# Patient Record
(demographics unavailable — no encounter records)

---

## 2018-04-29 NOTE — EMERGENCY DEPARTMENT REPORT
ED ENT HPI





- General


Chief complaint: Sore Throat


Stated complaint: SWOLLEN NECK/TONSILS


Time Seen by Provider: 04/29/18 10:42


Source: patient


Mode of arrival: Ambulatory


Limitations: No Limitations





- History of Present Illness


Initial comments: 





This is a 18-year-old female nontoxic, well nourished in appearance, no acute 

signs of distress presents to the ED with c/o of sore throat x1 day. Patient 

describes sore throat as swallowing razer blades.  Patient denies any fever, 

chills, headache, stiff neck, nausea, vomiting, chest pain, shortness of breath

, numbness or tingling. Patient denies any drooling or hoarseness.  Patient 

denies any allergies or significant past medical history.


MD complaint: sore throat


-: days(s) (1)


Location: throat


Severity: mild


Severity scale (0 -10): 8


Quality: aching


Consistency: constant


Improves with: none


Worsens with: swallowing


Associated Symptoms: pain with swallowing, sore throat.  denies: fever, cough, 

gum swelling, toothache, tinnitus, hearing loss, discharge from ear, rhinorrhea





- Related Data


 Previous Rx's











 Medication  Instructions  Recorded  Last Taken  Type


 


Clindamycin [Clindamycin CAP] 300 mg PO Q8H 7 Days  cap 04/29/18 Unknown Rx


 


Ibuprofen [Motrin] 600 mg PO Q8H PRN #30 tablet 04/29/18 Unknown Rx


 


Nystas/Diphen/Xyl Visc/Mylanta 15 ml MM Q4H PRN 7 Days  ml 04/29/18 Unknown Rx





[Magic Mouthwash]    


 


Prednisone [predniSONE 10 mg 10 mg PO .TAPER #1 tab.ds.pk 04/29/18 Unknown Rx





(6-Day Pack, 21 Tabs)]    











 Allergies











Allergy/AdvReac Type Severity Reaction Status Date / Time


 


No Known Allergies Allergy   Unverified 04/29/18 10:38














ED Dental HPI





- General


Chief complaint: Sore Throat


Stated complaint: SWOLLEN NECK/TONSILS


Time Seen by Provider: 04/29/18 10:42


Source: patient


Mode of arrival: Ambulatory


Limitations: No Limitations





- Related Data


 Previous Rx's











 Medication  Instructions  Recorded  Last Taken  Type


 


Clindamycin [Clindamycin CAP] 300 mg PO Q8H 7 Days  cap 04/29/18 Unknown Rx


 


Ibuprofen [Motrin] 600 mg PO Q8H PRN #30 tablet 04/29/18 Unknown Rx


 


Nystas/Diphen/Xyl Visc/Mylanta 15 ml MM Q4H PRN 7 Days  ml 04/29/18 Unknown Rx





[Magic Mouthwash]    


 


Prednisone [predniSONE 10 mg 10 mg PO .TAPER #1 tab.ds.pk 04/29/18 Unknown Rx





(6-Day Pack, 21 Tabs)]    











 Allergies











Allergy/AdvReac Type Severity Reaction Status Date / Time


 


No Known Allergies Allergy   Unverified 04/29/18 10:38














ED Review of Systems


ROS: 


Stated complaint: SWOLLEN NECK/TONSILS


Other details as noted in HPI





Constitutional: denies: chills, fever


Eyes: denies: eye pain, eye discharge, vision change


ENT: throat pain.  denies: ear pain


Respiratory: denies: cough, shortness of breath, wheezing


Cardiovascular: denies: chest pain, palpitations


Endocrine: no symptoms reported


Gastrointestinal: denies: abdominal pain, nausea, diarrhea


Genitourinary: denies: urgency, dysuria, discharge


Musculoskeletal: denies: back pain, joint swelling, arthralgia


Skin: denies: rash, lesions


Neurological: denies: headache, weakness, paresthesias


Psychiatric: denies: anxiety, depression


Hematological/Lymphatic: denies: easy bleeding, easy bruising





ED Past Medical Hx





- Past Medical History


Previous Medical History?: No





- Surgical History


Past Surgical History?: No





- Social History


Smoking Status: Never Smoker


Substance Use Type: None





- Medications


Home Medications: 


 Home Medications











 Medication  Instructions  Recorded  Confirmed  Last Taken  Type


 


Clindamycin [Clindamycin CAP] 300 mg PO Q8H 7 Days  cap 04/29/18  Unknown Rx


 


Ibuprofen [Motrin] 600 mg PO Q8H PRN #30 tablet 04/29/18  Unknown Rx


 


Nystas/Diphen/Xyl Visc/Mylanta 15 ml MM Q4H PRN 7 Days  ml 04/29/18  Unknown Rx





[Magic Mouthwash]     


 


Prednisone [predniSONE 10 mg 10 mg PO .TAPER #1 tab.ds.pk 04/29/18  Unknown Rx





(6-Day Pack, 21 Tabs)]     














ED Physical Exam





- General


Limitations: No Limitations


General appearance: alert, in no apparent distress





- Head


Head exam: Present: atraumatic, normocephalic





- Eye


Eye exam: Present: normal appearance


Pupils: Present: normal accommodation





- ENT


ENT exam: Present: mucous membranes moist, TM's normal bilaterally, normal 

external ear exam





- Expanded ENT Exam


  ** Expanded


Ear exam: Present: normal external inspection


Mouth exam: Present: normal external inspection, tongue normal.  Absent: 

drooling, trismus, muffled voice, tongue elevation, laceration


Teeth exam: Present: normal inspection


Throat exam: Positive: tonsillar erythema, tonsillomegaly (2+), tonsillar 

exudate, other (Uvula midline. No abscess or swelling noted. ).  Negative: R 

peritonsillar mass, L peritonsillar mass





- Neck


Neck exam: Present: normal inspection, full ROM, lymphadenopathy (bilateral 

tonsillar).  Absent: tenderness, meningismus





- Respiratory


Respiratory exam: Present: normal lung sounds bilaterally.  Absent: respiratory 

distress, wheezes, rales, rhonchi, stridor, chest wall tenderness, accessory 

muscle use, decreased breath sounds, prolonged expiratory





- Cardiovascular


Cardiovascular Exam: Present: regular rate, normal rhythm, normal heart sounds.

  Absent: irregular rhythm, systolic murmur, diastolic murmur, rubs, gallop





- GI/Abdominal


GI/Abdominal exam: Present: soft, normal bowel sounds.  Absent: distended, 

tenderness, guarding, rebound, rigid





- Rectal


Rectal exam: Present: deferred





- Extremities Exam


Extremities exam: Present: normal inspection, full ROM, normal capillary refill





- Back Exam


Back exam: Present: normal inspection, full ROM





- Neurological Exam


Neurological exam: Present: alert, oriented X3, normal gait





- Psychiatric


Psychiatric exam: Present: normal affect, normal mood





- Skin


Skin exam: Present: warm, dry, intact, normal color.  Absent: rash





ED Course





 Vital Signs











  04/29/18





  10:38


 


Temperature 99.6 F


 


Pulse Rate 116 H


 


Respiratory 20





Rate 


 


Blood Pressure 157/92


 


O2 Sat by Pulse 98





Oximetry 














- Reevaluation(s)


Reevaluation #1: 





04/29/18 10:54


Patient is speaking in full sentences with no signs of distress noted.





ED Medical Decision Making





- Medical Decision Making





Patient received Decadron and clinda IM. No drooling or hoarseness.  No abscess 

noted.  Patient discharged with Clinda and prednisone. Patient was instructed 

to Follow-up with a ENT doctor in 3-5 days or if symptoms worsen and continue 

return to emergency room as soon as possible. At time of discharge, the patient 

does not seem toxic or ill in appearance.  No acute signs of distress noted.  

Patient agrees to discharge treatment plan of care.  No further questions noted 

by the patient.


Critical care attestation.: 


If time is entered above; I have spent that time in minutes in the direct care 

of this critically ill patient, excluding procedure time.








ED Disposition


Clinical Impression: 


 Tonsillitis with exudate





Disposition: DC-01 TO HOME OR SELFCARE


Is pt being admited?: No


Does the pt Need Aspirin: No


Condition: Stable


Instructions:  Tonsillitis (ED)


Additional Instructions: 


Follow-up with a ENT doctor in 3-5 days or if symptoms worsen and continue 

return to emergency room as soon as possible.


Prescriptions: 


Clindamycin [Clindamycin CAP] 300 mg PO Q8H 7 Days  cap


Ibuprofen [Motrin] 600 mg PO Q8H PRN #30 tablet


 PRN Reason: Pain


Nystas/Diphen/Xyl Visc/Mylanta [Magic Mouthwash] 15 ml MM Q4H PRN 7 Days  ml


 PRN Reason: Sore Throat


Prednisone [predniSONE 10 mg (6-Day Pack, 21 Tabs)] 10 mg PO .TAPER #1 tab.ds.pk


Referrals: 


BLAS RAUSCH MD [Staff Physician] - 3-5 Days


PRIMARY CARE,MD [Referring] - 3-5 Days


LAWSON LLANOS MD [Staff Physician] - 3-5 Days


[facility] [Outside] - 3-5 Days


[facility] [Outside] - 3-5 Days


Forms:  Work/School Release Form(ED)